# Patient Record
Sex: MALE | Race: WHITE
[De-identification: names, ages, dates, MRNs, and addresses within clinical notes are randomized per-mention and may not be internally consistent; named-entity substitution may affect disease eponyms.]

---

## 2019-12-14 ENCOUNTER — HOSPITAL ENCOUNTER (EMERGENCY)
Dept: HOSPITAL 56 - MW.ED | Age: 19
Discharge: HOME | End: 2019-12-14
Payer: SELF-PAY

## 2019-12-14 DIAGNOSIS — Z72.51: ICD-10-CM

## 2019-12-14 DIAGNOSIS — R36.9: Primary | ICD-10-CM

## 2019-12-14 PROCEDURE — 96372 THER/PROPH/DIAG INJ SC/IM: CPT

## 2019-12-14 PROCEDURE — 81003 URINALYSIS AUTO W/O SCOPE: CPT

## 2019-12-14 PROCEDURE — 87491 CHLMYD TRACH DNA AMP PROBE: CPT

## 2019-12-14 PROCEDURE — 87591 N.GONORRHOEAE DNA AMP PROB: CPT

## 2019-12-14 PROCEDURE — 99283 EMERGENCY DEPT VISIT LOW MDM: CPT

## 2019-12-14 NOTE — EDM.PDOC
ED HPI GENERAL MEDICAL PROBLEM





- General


Chief Complaint: Genitourinary Problem


Stated Complaint: POSSIBLE BLADDER INFECTION


Time Seen by Provider: 12/14/19 20:06


Source of Information: Reports: Patient


History Limitations: Reports: No Limitations





- History of Present Illness


INITIAL COMMENTS - FREE TEXT/NARRATIVE: 


HISTORY AND PHYSICAL:





History of present illness:


Patient is a 19-year-old male who presents to the ED today with concern of 

penile discharge over the past several days. Patient states he did have 

unprotected sexual intercourse with a new partner approximately one week ago. 

Patient denies any burning with urination or any associated symptoms. Patient 

denies any other symptoms or concerns.





Patient denies fever, chills, chest pain, shortness of breath, or cough. Denies 

headache, neck stiff ness, change in vision, syncope, or near syncope. Denies 

nausea, vomiting, abdominal pain, diarrhea, constipation, or dysuria. Has not 

noted any blood in urine or stool. Patient has been eating and drinking 

appropriately.





Review of systems: 


As per history of present illness and below otherwise all systems reviewed and 

negative.





Past medical history: 


As per history of present illness and as reviewed below otherwise 

noncontributory.





Surgical history: 


As per history of present illness and as reviewed below otherwise 

noncontributory.





Social history: 


See social history for further information





Family history: 


As per history of present illness and as reviewed below otherwise 

noncontributory.





Physical exam:


General: Patient is alert, oriented, and in no acute distress. Patient sitting 

comfortably on exam table.


HEENT: Atraumatic, normocephalic, pupils equal and reactive bilaterally, 

negative for conjunctival pallor or scleral icterus, mucous membranes moist, 

TMs normal bilaterally, throat clear, neck supple, nontender, trachea midline. 

No drooling or trismus noted. No meningeal signs. No hot potato voice noted. 


Lungs: Clear to auscultation, breath sounds equal bilaterally, chest nontender.


Heart: S1S2, regular rate and rhythm without overt murmur


Abdomen: Soft, nondistended, nontender. Negative for masses or 

hepatosplenomegaly. Negative for costovertebral tenderness.


Pelvis: Stable nontender.


Genitourinary: Deferred.


Rectal: Deferred.


Skin: Intact, warm, dry. No lesions or rashes noted.


Extremities: Atraumatic, negative for cords or calf pain. Neurovascular 

unremarkable.


Neuro: Awake, alert, oriented. Cranial nerves II through XII unremarkable. 

Cerebellum unremarkable. Motor and sensory unremarkable throughout. Exam 

nonfocal.





Notes:


Discussed the importance for follow-up with a primary care provider and that if 

he desires full STD screening that he can get this done with his primary care 

provider or at the [facility].


Voices understanding and is agreeable to plan of care. Denies any further 

questions or concerns at this time.





Diagnostics:


UA, Gonorrhea and chlamydia





Therapeutics:


Rocephin, Azithromycin





Prescription:


None





Impression: 


Penile discharge


High risk sexual activity





Plan:


1. You can alternate ibuprofen and Tylenol as directed for pain and discomfort.


2. If he desires a full STD screening, you can get this done with her primary 

care provider or at the [facility].


3. Follow up with the primary care provider as discussed. Return to the ED as 

needed and as discussed.





Definitive disposition and diagnosis as appropriate pending reevaluation and 

review of above.








- Related Data


 Allergies











Allergy/AdvReac Type Severity Reaction Status Date / Time


 


No Known Allergies Allergy   Verified 12/14/19 19:34











Home Meds: 


 Home Meds





. [No Known Home Meds]  12/14/19 [History]











Past Medical History





- Past Health History


Medical/Surgical History: Denies Medical/Surgical History





Social & Family History





- Family History


Family Medical History: Noncontributory





- Tobacco Use


Smoking Status *Q: Never Smoker





- Recreational Drug Use


Recreational Drug Use: No





ED ROS GENERAL





- Review of Systems


Review Of Systems: Comprehensive ROS is negative, except as noted in HPI.





ED EXAM, GENERAL





- Physical Exam


Exam: See Below (see dictation)





Course





- Vital Signs


Last Recorded V/S: 





 Last Vital Signs











Temp  98.1 F   12/14/19 19:45


 


Pulse  81   12/14/19 19:45


 


Resp  18   12/14/19 19:45


 


BP  196/100 H  12/14/19 19:45


 


Pulse Ox  97   12/14/19 19:45














- Orders/Labs/Meds


Orders: 





 Active Orders 24 hr











 Category Date Time Status


 


 CHLAMYDIA AND GONORRHEA BY [facility] Stat Lab  12/14/19 20:06 Ordered


 


 UA RFX VIRAL AND CULT IF INDIC [URIN] Stat Lab  12/14/19 20:06 Ordered


 


 Azithromycin [Zithromax] Med  12/14/19 20:09 Stat





 1,000 mg PO NOW STA   


 


 cefTRIAXone [Rocephin] Med  12/14/19 20:09 Once





 1 gm IM ONETIME ONE   








 Medication Orders





Ceftriaxone Sodium (Rocephin)  1 gm IM ONETIME ONE


   Stop: 12/14/19 20:10








Meds: 





Medications











Generic Name Dose Route Start Last Admin





  Trade Name Freq  PRN Reason Stop Dose Admin


 


Ceftriaxone Sodium  1 gm  12/14/19 20:09  





  Rocephin  IM  12/14/19 20:10  





  ONETIME ONE   





     





     





     





     














Departure





- Departure


Time of Disposition: 20:12


Disposition: Home, Self-Care 01


Clinical Impression: 


 Drainage from penis





High risk sexual behavior


Qualifiers:


 High risk sexual behavior type: unspecified Qualified Code(s): Z72.51 - High 

risk heterosexual behavior








- Discharge Information


Referrals: 


PCP,None [Primary Care Provider] - 


Additional Instructions: 


The following information is given to patients seen in the emergency department 

who are being discharged to home. This information is to outline your options 

for follow-up care. We provide all patients seen in our emergency department 

with a follow-up referral.





The need for follow-up, as well as the timing and circumstances, are variable 

depending upon the specifics of your emergency department visit.





If you don't have a primary care physician on staff, we will provide you with a 

referral. We always advise you to contact your personal physician following an 

emergency department visit to inform them of the circumstance of the visit and 

for follow-up with them and/or the need for any referrals to a consulting 

specialist.





The emergency department will also refer you to a specialist when appropriate. 

This referral assures that you have the opportunity for follow-up care with a 

specialist. All of these measure are taken in an effort to provide you with 

optimal care, which includes your follow-up.





Under all circumstances we always encourage you to contact your private 

physician who remains a resource for coordinating your care. When calling for 

follow-up care, please make the office aware that this follow-up is from your 

recent emergency room visit. If for any reason you are refused follow-up, 

please contact the [facility] Emergency 

Department at [phone] and asked to speak to the emergency department 

charge nurse.





[facility]


Primary Care


[address] [place] [id number]


Phone: [phone]


Fax: [phone]





[facility]


[address] [place] [address]


Phone: [phone]


Fax: [phone]





1. You can alternate ibuprofen and Tylenol as directed for pain and discomfort.


2. If he desires a full STD screening, you can get this done with her primary 

care provider or at the [facility].


3. Follow up with the primary care provider as discussed. Return to the ED as 

needed and as discussed.








 








Sepsis Event Note





- Evaluation


Sepsis Screening Result: No Definite Risk





- Focused Exam


Vital Signs: 





 Vital Signs











  Temp Pulse Resp BP Pulse Ox


 


 12/14/19 19:45  98.1 F  81  18  196/100 H  97











Date Exam was Performed: 12/14/19


Time Exam was Performed: 20:10





- My Orders


Last 24 Hours: 





My Active Orders





12/14/19 20:06


CHLAMYDIA AND GONORRHEA BY [facility] Stat 


UA RFX VIRAL AND CULT IF INDIC [URIN] Stat 





12/14/19 20:09


Azithromycin [Zithromax]   1,000 mg PO NOW STA 


cefTRIAXone [Rocephin]   1 gm IM ONETIME ONE 














- Assessment/Plan


Last 24 Hours: 





My Active Orders





12/14/19 20:06


CHLAMYDIA AND GONORRHEA BY [facility] Stat 


UA RFX VIRAL AND CULT IF INDIC [URIN] Stat 





12/14/19 20:09


Azithromycin [Zithromax]   1,000 mg PO NOW STA 


cefTRIAXone [Rocephin]   1 gm IM ONETIME ONE